# Patient Record
(demographics unavailable — no encounter records)

---

## 2025-04-29 NOTE — DATA REVIEWED
[de-identified] :  Type A tymyolis, AU. Testing via inserts: - 8000Hz, AU. Recs: 1) F/u w/ MD 2) Further tests & recs as per MD
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
## 2025-04-29 NOTE — ASSESSMENT
[FreeTextEntry1] : Patient  with problem with vertigo which  started about 3 weeks ago.  Now doing  well - no  further problems now no prior problems.  Exam today unremarkable - has mild bilat symmetric snhl and A tymps.  Feel problem vestibular neuronitis and recommended follow up in one year for hearing .  Return sooner if problems start again

## 2025-04-29 NOTE — REVIEW OF SYSTEMS
[Ear Pain] : ear pain [Dizziness] : dizziness [Vertigo] : vertigo [Ear Drainage] : ear drainage [Negative] : Heme/Lymph [Ear Itch] : no ear itch [Hearing Loss] : no hearing loss [de-identified] : fluid in left ear  rt ear pain , pressure